# Patient Record
Sex: MALE | Race: WHITE | NOT HISPANIC OR LATINO | Employment: OTHER | URBAN - METROPOLITAN AREA
[De-identification: names, ages, dates, MRNs, and addresses within clinical notes are randomized per-mention and may not be internally consistent; named-entity substitution may affect disease eponyms.]

---

## 2020-09-01 ENCOUNTER — OFFICE VISIT (OUTPATIENT)
Dept: URGENT CARE | Facility: CLINIC | Age: 40
End: 2020-09-01
Payer: COMMERCIAL

## 2020-09-01 VITALS
HEART RATE: 68 BPM | HEIGHT: 70 IN | WEIGHT: 145 LBS | BODY MASS INDEX: 20.76 KG/M2 | OXYGEN SATURATION: 100 % | DIASTOLIC BLOOD PRESSURE: 65 MMHG | SYSTOLIC BLOOD PRESSURE: 119 MMHG | TEMPERATURE: 96 F | RESPIRATION RATE: 16 BRPM

## 2020-09-01 DIAGNOSIS — R51.9 ACUTE NONINTRACTABLE HEADACHE, UNSPECIFIED HEADACHE TYPE: ICD-10-CM

## 2020-09-01 DIAGNOSIS — K04.7 DENTAL INFECTION: Primary | ICD-10-CM

## 2020-09-01 PROCEDURE — 99203 OFFICE O/P NEW LOW 30 MIN: CPT | Performed by: PHYSICIAN ASSISTANT

## 2020-09-01 RX ORDER — IBUPROFEN 200 MG
TABLET ORAL EVERY 6 HOURS PRN
COMMUNITY

## 2020-09-01 RX ORDER — DIPHENHYDRAMINE HCL 25 MG
25 TABLET ORAL EVERY 6 HOURS PRN
COMMUNITY

## 2020-09-01 RX ORDER — PREDNISONE 10 MG/1
10 TABLET ORAL DAILY
Qty: 21 TABLET | Refills: 0 | Status: SHIPPED | OUTPATIENT
Start: 2020-09-01 | End: 2022-07-27 | Stop reason: ALTCHOICE

## 2020-09-01 RX ORDER — CETIRIZINE HYDROCHLORIDE 10 MG/1
10 TABLET ORAL DAILY
COMMUNITY
End: 2022-07-27 | Stop reason: ALTCHOICE

## 2020-09-01 RX ORDER — AMOXICILLIN 500 MG/1
500 CAPSULE ORAL EVERY 12 HOURS SCHEDULED
Qty: 14 CAPSULE | Refills: 0 | Status: SHIPPED | OUTPATIENT
Start: 2020-09-01 | End: 2020-09-08

## 2020-09-01 NOTE — PROGRESS NOTES
[facility]        NAME: Priyanka Loomis is a 35 y o  male  : 1980    MRN: [id number]  DATE: 2020  TIME: 6:45 PM    Assessment and Plan   Dental infection [K04 7]  1  Dental infection  amoxicillin (AMOXIL) 500 mg capsule    predniSONE 10 mg tablet    Ambulatory referral to Dentistry   2  Acute nonintractable headache, unspecified headache type       Dental infection verses old billing verse facial nerve irritation  Will treat as infection due to surrounding gingivitis and patient history of recurrent food stuck  Patient Instructions   Take antibiotic as prescribed  Complete full dose even if symptoms began to improve  Take prednisone as prescribed  Make appoint with dentistry  Observe for signs of worsening infection including increased pain, discharge, fevers and chills, or persistent symptoms  Follow up with PCP in 3-5 days  Proceed to  ER if symptoms worsen  Chief Complaint     Chief Complaint   Patient presents with    Neck Pain     c/o tenderness right side neck under jaw area that radiates up side of neck and head approx 4 days, worsening since onset  History of Present Illness       Patient is a 44-year-old male with significant past medical history of smoking and seasonal allergies presents the office complaining of anterior right-sided neck pain just proximal to the mandible with radiation behind the ear and up to the right temple for 4 days  Pain is a mild discomfort at rest and rated a 6-7/10 sharp pain with yawning which only last a few seconds  Patient also reports a constant 6-7/10 "annoying" right temporal headache  He has some mild rhinorrhea and intermittent odynophagia which he attributes to allergies but denies any fevers, chills, dizziness, lightheadedness, changes in vision, ear pain, cough, dental pain, dysphagia, sore throat, or trouble breathing    Patient does not believe it to be dental related but admits he often gets food stuck between his teeth in that area  He has been treating his symptoms with ibuprofen with good results  Review of Systems   Review of Systems   Constitutional: Negative for chills and fever  HENT: Positive for rhinorrhea  Negative for congestion, ear pain, sore throat and trouble swallowing  Respiratory: Negative for cough and shortness of breath  Cardiovascular: Negative for chest pain and palpitations  Gastrointestinal: Negative for abdominal pain, diarrhea, nausea and vomiting  Musculoskeletal: Positive for neck pain  Negative for myalgias  Skin: Negative for rash  Neurological: Positive for headaches  Negative for dizziness and light-headedness  Current Medications       Current Outpatient Medications:     cetirizine (ZyrTEC) 10 mg tablet, Take 10 mg by mouth daily, Disp: , Rfl:     diphenhydrAMINE (BENADRYL) 25 mg tablet, Take 25 mg by mouth every 6 (six) hours as needed for itching, Disp: , Rfl:     ibuprofen (MOTRIN) 200 mg tablet, Take by mouth every 6 (six) hours as needed for mild pain, Disp: , Rfl:     amoxicillin (AMOXIL) 500 mg capsule, Take 1 capsule (500 mg total) by mouth every 12 (twelve) hours for 7 days, Disp: 14 capsule, Rfl: 0    predniSONE 10 mg tablet, Take 1 tablet (10 mg total) by mouth daily Take 6 on day 1, take 5 on day 2, take 4 on day 3, take 3 on day 4, take 2 on day 5, take 1 on day 6 , Disp: 21 tablet, Rfl: 0    Current Allergies     Allergies as of 09/01/2020 - Reviewed 09/01/2020   Allergen Reaction Noted    Morphine Syncope 09/01/2020            The following portions of the patient's history were reviewed and updated as appropriate: allergies, current medications, past family history, past medical history, past social history, past surgical history and problem list      Past Medical History:   Diagnosis Date    Allergic        Past Surgical History:   Procedure Laterality Date    HERNIA REPAIR      IR CHEST TUBE PLACEMENT         History reviewed  No pertinent family history  Medications have been verified  Objective   /65   Pulse 68   Temp (!) 96 °F (35 6 °C) (Tympanic)   Resp 16   Ht 5' 10" (1 778 m)   Wt 65 8 kg (145 lb)   SpO2 100%   BMI 20 81 kg/m²        Physical Exam     Physical Exam  Vitals signs and nursing note reviewed  Constitutional:       Appearance: Normal appearance  He is well-developed  HENT:      Head: Normocephalic and atraumatic  Right Ear: Tympanic membrane, ear canal and external ear normal       Left Ear: Tympanic membrane, ear canal and external ear normal       Nose: Nose normal       Mouth/Throat:      Lips: Pink  Mouth: Mucous membranes are moist       Dentition: Abnormal dentition  Gingival swelling and dental caries (Right lower posterior cavity verses old filling  Surrounding gingivitis ) present  No dental tenderness or dental abscesses  Pharynx: Oropharynx is clear  Uvula midline  Eyes:      General: Lids are normal       Conjunctiva/sclera: Conjunctivae normal       Pupils: Pupils are equal, round, and reactive to light  Neck:      Musculoskeletal: Neck supple  Cardiovascular:      Rate and Rhythm: Normal rate and regular rhythm  Heart sounds: Normal heart sounds  No murmur  No friction rub  No gallop  Pulmonary:      Effort: Pulmonary effort is normal       Breath sounds: Normal breath sounds  No stridor  No wheezing or rales  Musculoskeletal: Normal range of motion  Lymphadenopathy:      Cervical: No cervical adenopathy  Skin:     General: Skin is warm and dry  Capillary Refill: Capillary refill takes less than 2 seconds  Findings: No rash  Neurological:      Mental Status: He is alert

## 2020-09-01 NOTE — PATIENT INSTRUCTIONS
Take antibiotic as prescribed  Complete full dose even if symptoms began to improve  Take prednisone as prescribed  Make appoint with dentistry  Observe for signs of worsening infection including increased pain, discharge, fevers and chills, or persistent symptoms  Follow-up with PCP in 3-5 days  Go to ER if symptoms become severe  Dental Caries   WHAT YOU NEED TO KNOW:   Dental caries are also called cavities  Cavities are caused by bacteria  When the bacteria in tooth plaque (sticky film) mix with certain types of carbohydrate, this creates acid  The acid breaks down areas of enamel, which covers the outside of a tooth  This creates a small hole in the tooth called a cavity  DISCHARGE INSTRUCTIONS:   Return to the emergency department if:   · Your face, jaw, cheek, eye, or neck begin to swell  Contact your dentist if:   · You have a fever  · Your tooth pain gets worse  · You have questions or concerns about your condition or care  Follow up with your dentist as directed:  Write down your questions so you remember to ask them during your visits  Prevent dental caries:   · Brush your teeth at least 2 times a day with fluoride toothpaste  · Use dental floss to clean between your teeth at least once a day  · Rinse your mouth with water or mouthwash after meals and snacks  · Chew sugarless gum after meals and snacks  · See your dentist regularly for dental cleanings and oral exams  © 2017 [address] is for End User's use only and may not be sold, redistributed or otherwise used for commercial purposes  All illustrations and images included in CareNotes® are the copyrighted property of A D A M , Inc  or Jose Ortiz  The above information is an  only  It is not intended as medical advice for individual conditions or treatments   Talk to your doctor, nurse or pharmacist before following any medical regimen to see if it is safe and effective for you

## 2022-07-27 ENCOUNTER — APPOINTMENT (EMERGENCY)
Dept: RADIOLOGY | Facility: HOSPITAL | Age: 42
End: 2022-07-27
Payer: COMMERCIAL

## 2022-07-27 ENCOUNTER — HOSPITAL ENCOUNTER (EMERGENCY)
Facility: HOSPITAL | Age: 42
Discharge: HOME | End: 2022-07-27
Attending: EMERGENCY MEDICINE
Payer: COMMERCIAL

## 2022-07-27 VITALS
OXYGEN SATURATION: 99 % | RESPIRATION RATE: 15 BRPM | DIASTOLIC BLOOD PRESSURE: 77 MMHG | TEMPERATURE: 98.1 F | HEART RATE: 56 BPM | WEIGHT: 145 LBS | HEIGHT: 71 IN | SYSTOLIC BLOOD PRESSURE: 113 MMHG | BODY MASS INDEX: 20.3 KG/M2

## 2022-07-27 DIAGNOSIS — U07.1 COVID-19: ICD-10-CM

## 2022-07-27 DIAGNOSIS — R06.00 DYSPNEA: Primary | ICD-10-CM

## 2022-07-27 LAB
ALBUMIN SERPL BCP-MCNC: 4.3 G/DL (ref 3.5–5)
ALP SERPL-CCNC: 45 U/L (ref 46–116)
ALT SERPL W P-5'-P-CCNC: 30 U/L (ref 12–78)
ANION GAP SERPL CALCULATED.3IONS-SCNC: 4 MMOL/L (ref 4–13)
AST SERPL W P-5'-P-CCNC: 27 U/L (ref 5–45)
ATRIAL RATE: 69 BPM
BASOPHILS # BLD AUTO: 0.01 THOUSANDS/ΜL (ref 0–0.1)
BASOPHILS NFR BLD AUTO: 0 % (ref 0–1)
BILIRUB SERPL-MCNC: 0.55 MG/DL (ref 0.2–1)
BUN SERPL-MCNC: 16 MG/DL (ref 5–25)
CALCIUM SERPL-MCNC: 8.8 MG/DL (ref 8.3–10.1)
CARDIAC TROPONIN I PNL SERPL HS: 4 NG/L
CHLORIDE SERPL-SCNC: 103 MMOL/L (ref 96–108)
CO2 SERPL-SCNC: 31 MMOL/L (ref 21–32)
CREAT SERPL-MCNC: 1.26 MG/DL (ref 0.6–1.3)
D DIMER PPP FEU-MCNC: <0.27 UG/ML FEU
EOSINOPHIL # BLD AUTO: 0.01 THOUSAND/ΜL (ref 0–0.61)
EOSINOPHIL NFR BLD AUTO: 0 % (ref 0–6)
ERYTHROCYTE [DISTWIDTH] IN BLOOD BY AUTOMATED COUNT: 11.6 % (ref 11.6–15.1)
GFR SERPL CREATININE-BSD FRML MDRD: 69 ML/MIN/1.73SQ M
GLUCOSE SERPL-MCNC: 100 MG/DL (ref 65–140)
HCT VFR BLD AUTO: 45.3 % (ref 36.5–49.3)
HGB BLD-MCNC: 15.3 G/DL (ref 12–17)
IMM GRANULOCYTES # BLD AUTO: 0.01 THOUSAND/UL (ref 0–0.2)
IMM GRANULOCYTES NFR BLD AUTO: 0 % (ref 0–2)
LYMPHOCYTES # BLD AUTO: 1.36 THOUSANDS/ΜL (ref 0.6–4.47)
LYMPHOCYTES NFR BLD AUTO: 37 % (ref 14–44)
MCH RBC QN AUTO: 29.4 PG (ref 26.8–34.3)
MCHC RBC AUTO-ENTMCNC: 33.8 G/DL (ref 31.4–37.4)
MCV RBC AUTO: 87 FL (ref 82–98)
MONOCYTES # BLD AUTO: 0.3 THOUSAND/ΜL (ref 0.17–1.22)
MONOCYTES NFR BLD AUTO: 8 % (ref 4–12)
NEUTROPHILS # BLD AUTO: 1.97 THOUSANDS/ΜL (ref 1.85–7.62)
NEUTS SEG NFR BLD AUTO: 55 % (ref 43–75)
NRBC BLD AUTO-RTO: 0 /100 WBCS
NT-PROBNP SERPL-MCNC: 125 PG/ML
P AXIS: 77 DEGREES
PLATELET # BLD AUTO: 197 THOUSANDS/UL (ref 149–390)
PMV BLD AUTO: 10.6 FL (ref 8.9–12.7)
POTASSIUM SERPL-SCNC: 4.8 MMOL/L (ref 3.5–5.3)
PR INTERVAL: 168 MS
PROT SERPL-MCNC: 7.6 G/DL (ref 6.4–8.4)
QRS AXIS: 85 DEGREES
QRSD INTERVAL: 88 MS
QT INTERVAL: 396 MS
QTC INTERVAL: 424 MS
RBC # BLD AUTO: 5.21 MILLION/UL (ref 3.88–5.62)
SARS-COV-2 AG UPPER RESP QL IA: POSITIVE
SODIUM SERPL-SCNC: 138 MMOL/L (ref 135–147)
T WAVE AXIS: 58 DEGREES
VENTRICULAR RATE: 69 BPM
WBC # BLD AUTO: 3.66 THOUSAND/UL (ref 4.31–10.16)

## 2022-07-27 PROCEDURE — 80053 COMPREHEN METABOLIC PANEL: CPT | Performed by: EMERGENCY MEDICINE

## 2022-07-27 PROCEDURE — 71045 X-RAY EXAM CHEST 1 VIEW: CPT

## 2022-07-27 PROCEDURE — 84484 ASSAY OF TROPONIN QUANT: CPT | Performed by: EMERGENCY MEDICINE

## 2022-07-27 PROCEDURE — 93005 ELECTROCARDIOGRAM TRACING: CPT

## 2022-07-27 PROCEDURE — 87811 SARS-COV-2 COVID19 W/OPTIC: CPT | Performed by: EMERGENCY MEDICINE

## 2022-07-27 PROCEDURE — 36415 COLL VENOUS BLD VENIPUNCTURE: CPT | Performed by: EMERGENCY MEDICINE

## 2022-07-27 PROCEDURE — 99284 EMERGENCY DEPT VISIT MOD MDM: CPT

## 2022-07-27 PROCEDURE — 99285 EMERGENCY DEPT VISIT HI MDM: CPT | Performed by: EMERGENCY MEDICINE

## 2022-07-27 PROCEDURE — 85025 COMPLETE CBC W/AUTO DIFF WBC: CPT | Performed by: EMERGENCY MEDICINE

## 2022-07-27 PROCEDURE — 85379 FIBRIN DEGRADATION QUANT: CPT | Performed by: EMERGENCY MEDICINE

## 2022-07-27 PROCEDURE — 83880 ASSAY OF NATRIURETIC PEPTIDE: CPT | Performed by: EMERGENCY MEDICINE

## 2022-07-27 RX ORDER — LORAZEPAM 1 MG/1
1 TABLET ORAL ONCE
Status: COMPLETED | OUTPATIENT
Start: 2022-07-27 | End: 2022-07-27

## 2022-07-27 RX ADMIN — LORAZEPAM 1 MG: 1 TABLET ORAL at 10:44

## 2022-07-27 NOTE — Clinical Note
Omidfarideh Guerriergraham was seen and treated in our emergency department on 7/27/2022  Diagnosis:     Vicenta Blum  may return to work on return date  He may return on this date: 07/30/2022    If fever free for 48 hours     If you have any questions or concerns, please don't hesitate to call        Pierre Reinoso MD    ______________________________           _______________          _______________  Hospital Representative                              Date                                Time

## 2022-07-27 NOTE — ED PROVIDER NOTES
History  Chief Complaint   Patient presents with    Generalized Body Aches     Pt  States he is short of breath, body aches, rapid heartbeat at work yesterday with anxiety       History provided by:  Medical records, patient and significant other  Shortness of Breath  Severity:  Mild  Onset quality:  Gradual  Duration:  1 week  Timing:  Constant  Progression:  Unchanged  Chronicity:  New  Context comment:  Patient with URI last week, tested positive for COVID 1 week ago, has been having feeling of constant panic attack since  Poor appetite but eating small amounts and drinking well  Relieved by:  Nothing  Worsened by:  Nothing  Ineffective treatments:  None tried  Associated symptoms: no abdominal pain, no chest pain, no cough, no ear pain, no fever, no headaches, no rash, no sore throat and no vomiting        Prior to Admission Medications   Prescriptions Last Dose Informant Patient Reported? Taking? diphenhydrAMINE (BENADRYL) 25 mg tablet 7/26/2022 at Unknown time  Yes Yes   Sig: Take 25 mg by mouth every 6 (six) hours as needed for itching   ibuprofen (MOTRIN) 200 mg tablet 7/26/2022 at Unknown time  Yes Yes   Sig: Take by mouth every 6 (six) hours as needed for mild pain      Facility-Administered Medications: None       Past Medical History:   Diagnosis Date    Allergic     Pneumothorax        Past Surgical History:   Procedure Laterality Date    HERNIA REPAIR      IR CHEST TUBE PLACEMENT         History reviewed  No pertinent family history  I have reviewed and agree with the history as documented      E-Cigarette/Vaping    E-Cigarette Use Current Every Day User     Cartridges/Day 1-2      E-Cigarette/Vaping Substances    Nicotine Yes     THC No     CBD No     Flavoring Yes     Other No     Unknown No      Social History     Tobacco Use    Smoking status: Current Every Day Smoker     Types: E-Cigarettes    Smokeless tobacco: Never Used   Vaping Use    Vaping Use: Every day    Substances: Nicotine, Flavoring   Substance Use Topics    Alcohol use: Yes     Alcohol/week: 12 0 standard drinks     Types: 12 Cans of beer per week    Drug use: Yes     Types: Marijuana       Review of Systems   Constitutional: Positive for appetite change and fatigue  Negative for chills and fever  HENT: Negative for ear pain, rhinorrhea, sore throat and trouble swallowing  Eyes: Negative for pain, discharge and visual disturbance  Respiratory: Positive for shortness of breath  Negative for cough and chest tightness  Cardiovascular: Negative for chest pain and palpitations  Gastrointestinal: Negative for abdominal pain, nausea and vomiting  Endocrine: Negative for polydipsia, polyphagia and polyuria  Genitourinary: Negative for difficulty urinating, dysuria, hematuria and testicular pain  Musculoskeletal: Negative for arthralgias and back pain  Skin: Negative for color change and rash  Allergic/Immunologic: Negative for immunocompromised state  Neurological: Negative for dizziness, seizures, syncope, weakness and headaches  Hematological: Negative for adenopathy  Psychiatric/Behavioral: Negative for confusion and dysphoric mood  All other systems reviewed and are negative  Physical Exam  Physical Exam  Constitutional:       General: He is not in acute distress  Appearance: Normal appearance  He is not ill-appearing, toxic-appearing or diaphoretic  HENT:      Head: Normocephalic and atraumatic  Nose: Nose normal  No congestion or rhinorrhea  Mouth/Throat:      Mouth: Mucous membranes are moist       Pharynx: Oropharynx is clear  No oropharyngeal exudate or posterior oropharyngeal erythema  Eyes:      General:         Right eye: No discharge  Left eye: No discharge  Cardiovascular:      Rate and Rhythm: Normal rate and regular rhythm  Pulses: Normal pulses  Heart sounds: Normal heart sounds  No murmur heard  No gallop     Pulmonary:      Effort: Pulmonary effort is normal  No respiratory distress  Breath sounds: Normal breath sounds  No stridor  No wheezing, rhonchi or rales  Chest:      Chest wall: No tenderness  Abdominal:      General: Bowel sounds are normal  There is no distension  Palpations: Abdomen is soft  There is no mass  Tenderness: There is no abdominal tenderness  There is no right CVA tenderness, left CVA tenderness, guarding or rebound  Hernia: No hernia is present  Musculoskeletal:         General: Normal range of motion  Cervical back: Normal range of motion and neck supple  Skin:     General: Skin is warm and dry  Capillary Refill: Capillary refill takes less than 2 seconds  Neurological:      General: No focal deficit present  Mental Status: He is alert and oriented to person, place, and time  Cranial Nerves: No cranial nerve deficit  Sensory: No sensory deficit  Motor: No weakness  Coordination: Coordination normal       Gait: Gait normal       Deep Tendon Reflexes: Reflexes normal    Psychiatric:         Mood and Affect: Mood normal          Behavior: Behavior normal          Thought Content:  Thought content normal          Judgment: Judgment normal          Vital Signs  ED Triage Vitals [07/27/22 0836]   Temperature Pulse Respirations Blood Pressure SpO2   98 1 °F (36 7 °C) 67 18 115/73 99 %      Temp Source Heart Rate Source Patient Position - Orthostatic VS BP Location FiO2 (%)   Temporal Monitor Lying Right arm --      Pain Score       No Pain           Vitals:    07/27/22 0915 07/27/22 0930 07/27/22 0945 07/27/22 1000   BP: 119/73 109/68 110/74 113/77   Pulse: 60 60 62 56   Patient Position - Orthostatic VS:             Visual Acuity      ED Medications  Medications   LORazepam (ATIVAN) tablet 1 mg (1 mg Oral Given 7/27/22 1044)       Diagnostic Studies  Results Reviewed     Procedure Component Value Units Date/Time    HS Troponin I 4hr [[id number]]     Lab Status: No result Specimen: Blood     HS Troponin I 2hr [[id number]]     Lab Status: No result Specimen: Blood     HS Troponin 0hr (reflex protocol) [[id number]]  (Normal) Collected: 07/27/22 0853    Lab Status: Final result Specimen: Blood from Arm, Left Updated: 07/27/22 0923     hs TnI 0hr 4 ng/L     Comprehensive metabolic panel [[id number]]  (Abnormal) Collected: 07/27/22 0853    Lab Status: Final result Specimen: Blood from Arm, Left Updated: 07/27/22 5362     Sodium 138 mmol/L      Potassium 4 8 mmol/L      Chloride 103 mmol/L      CO2 31 mmol/L      ANION GAP 4 mmol/L      BUN 16 mg/dL      Creatinine 1 26 mg/dL      Glucose 100 mg/dL      Calcium 8 8 mg/dL      AST 27 U/L      ALT 30 U/L      Alkaline Phosphatase 45 U/L      Total Protein 7 6 g/dL      Albumin 4 3 g/dL      Total Bilirubin 0 55 mg/dL      eGFR 69 ml/min/1 73sq m     Narrative:      [place] guidelines for Chronic Kidney Disease (CKD):     Stage 1 with normal or high GFR (GFR > 90 mL/min/1 73 square meters)    Stage 2 Mild CKD (GFR = 60-89 mL/min/1 73 square meters)    Stage 3A Moderate CKD (GFR = 45-59 mL/min/1 73 square meters)    Stage 3B Moderate CKD (GFR = 30-44 mL/min/1 73 square meters)    Stage 4 Severe CKD (GFR = 15-29 mL/min/1 73 square meters)    Stage 5 End Stage CKD (GFR <15 mL/min/1 73 square meters)  Note: GFR calculation is accurate only with a steady state creatinine    NT-BNP PRO [[id number]]  (Abnormal) Collected: 07/27/22 0853    Lab Status: Final result Specimen: Blood from Arm, Left Updated: 07/27/22 0922     NT-proBNP 125 pg/mL     D-Dimer [[id number]]  (Normal) Collected: 07/27/22 0853    Lab Status: Final result Specimen: Blood from Arm, Left Updated: 07/27/22 0918     D-Dimer, Quant <0 27 ug/ml FEU     COVID Rapid Antigen [[id number]]  (Abnormal) Collected: 07/27/22 0853    Lab Status: Final result Specimen: Nares from Nose Updated: 07/27/22 0918     SARS-CoV-2 Ag Positive    CBC and differential [[id number]]  (Abnormal) Collected: 07/27/22 0853    Lab Status: Final result Specimen: Blood from Arm, Left Updated: 07/27/22 0859     WBC 3 66 Thousand/uL      RBC 5 21 Million/uL      Hemoglobin 15 3 g/dL      Hematocrit 45 3 %      MCV 87 fL      MCH 29 4 pg      MCHC 33 8 g/dL      RDW 11 6 %      MPV 10 6 fL      Platelets 884 Thousands/uL      nRBC 0 /100 WBCs      Neutrophils Relative 55 %      Immat GRANS % 0 %      Lymphocytes Relative 37 %      Monocytes Relative 8 %      Eosinophils Relative 0 %      Basophils Relative 0 %      Neutrophils Absolute 1 97 Thousands/µL      Immature Grans Absolute 0 01 Thousand/uL      Lymphocytes Absolute 1 36 Thousands/µL      Monocytes Absolute 0 30 Thousand/µL      Eosinophils Absolute 0 01 Thousand/µL      Basophils Absolute 0 01 Thousands/µL                  XR chest 1 view portable   ED Interpretation by Tonia Henry MD (07/27 0915)   No acute pathology      Final Result by Horacio Drew MD (07/27 3642)      No acute cardiopulmonary disease  Workstation performed: [id number]                    Procedures  Procedures         ED Course  ED Course as of 07/27/22 1200   Wed Jul 27, 2022   0936 0826:  Patient appears well, vital signs reviewed  Placed on a monitor  COVID positive 1 week ago  Patient with ongoing dyspnea, states he has a history of anxiety attacks and not sure if this is the etiology  No chest pain  Plan to complete basic labs including cardiac enzymes, D-dimer, BNP, check EKG and chest x-ray  1000 1000:  Chest x-ray and labs reviewed  Patient has remained stable throughout ED course  Stable for discharge               HEART Risk Score    Flowsheet Row Most Recent Value   Heart Score Risk Calculator    History 0 Filed at: 07/27/2022 1010   ECG 0 Filed at: 07/27/2022 1010   Age 0 Filed at: 07/27/2022 1010   Risk Factors 0 Filed at: 07/27/2022 1010   Troponin 0 Filed at: 07/27/2022 1010   HEART Score 0 Filed at: 07/27/2022 1010                        SBIRT 22yo+    Flowsheet Row Most Recent Value   SBIRT (23 yo +)    In order to provide better care to our patients, we are screening all of our patients for alcohol and drug use  Would it be okay to ask you these screening questions? Yes Filed at: 07/27/2022 1045   Initial Alcohol Screen: US AUDIT-C     1  How often do you have a drink containing alcohol? 4 Filed at: 07/27/2022 1045   2  How many drinks containing alcohol do you have on a typical day you are drinking? 0 Filed at: 07/27/2022 1045   3a  Male UNDER 65: How often do you have five or more drinks on one occasion? 0 Filed at: 07/27/2022 1045   Audit-C Score 4 Filed at: 07/27/2022 1045   MARYELLEN: How many times in the past year have you    Used an illegal drug or used a prescription medication for non-medical reasons? Never Filed at: 07/27/2022 1045                    MDM    Disposition  Final diagnoses:   Dyspnea   COVID-19     Time reflects when diagnosis was documented in both MDM as applicable and the Disposition within this note     Time User Action Codes Description Comment    7/27/2022 10:10 AM Wandra Cinnamon Add [R06 00] Dyspnea     7/27/2022 10:10 AM Wandra Cinnamon Add [U07 1] COVID-19       ED Disposition     ED Disposition   Discharge    Condition   Stable    Date/Time   Wed Jul 27, 2022 10:10 AM    Comment   Myla Dry discharge to home/self care  Follow-up Information     Follow up With Specialties Details Why Contact Jerry Clark  Schedule an appointment as soon as possible for a visit  As needed [address] [place] [id number]  [phone]          Discharge Medication List as of 7/27/2022 10:11 AM      CONTINUE these medications which have NOT CHANGED    Details   diphenhydrAMINE (BENADRYL) 25 mg tablet Take 25 mg by mouth every 6 (six) hours as needed for itching, Historical Med      ibuprofen (MOTRIN) 200 mg tablet Take by mouth every 6 (six) hours as needed for mild pain, Historical Med             No discharge procedures on file      PDMP Review     None          ED Provider  Electronically Signed by           Emily Wright MD  07/27/22 1200

## 2022-10-23 ENCOUNTER — OFFICE VISIT (OUTPATIENT)
Dept: URGENT CARE | Facility: CLINIC | Age: 42
End: 2022-10-23
Payer: COMMERCIAL

## 2022-10-23 VITALS
BODY MASS INDEX: 20.3 KG/M2 | TEMPERATURE: 97 F | HEART RATE: 66 BPM | WEIGHT: 145 LBS | OXYGEN SATURATION: 99 % | DIASTOLIC BLOOD PRESSURE: 76 MMHG | RESPIRATION RATE: 16 BRPM | SYSTOLIC BLOOD PRESSURE: 121 MMHG | HEIGHT: 71 IN

## 2022-10-23 DIAGNOSIS — S70.362A TICK BITE OF LEFT THIGH, INITIAL ENCOUNTER: Primary | ICD-10-CM
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
## 2022-10-23 PROCEDURE — 99213 OFFICE O/P EST LOW 20 MIN: CPT | Performed by: PHYSICIAN ASSISTANT

## 2022-10-23 RX ORDER — DOXYCYCLINE 100 MG/1
200 TABLET ORAL ONCE
Qty: 2 TABLET | Refills: 0 | Status: SHIPPED | COMMUNITY
Start: 2022-10-23 | End: 2022-10-23

## 2022-10-23 NOTE — PROGRESS NOTES
[facility]        NAME: Aníbal Sahni is a 36 y o  male  : 1980    MRN: [id number]  DATE: 2022  TIME: 10:10 AM    Assessment and Plan   Tick bite of left thigh, initial encounter Karis Nava  XXXA]  1  Tick bite of left thigh, initial encounter  doxycycline (ADOXA) 100 MG tablet         Patient Instructions   Take antibiotic as prescribed  Complete full dose of antibiotics even if symptoms begin to improve or resolve  May use OTC Tylenol for fever  Observe for signs of worsening infection including increased swelling, redness, pain, discharge, fever or chills, or persistent symptoms  Your symptoms should begin to improve over the next couple days  Take doxycycline as prescribed  Do not use dairy or reflux medications 2 hours before or 2 hours after taking  Avoid sun exposure  Follow up with PCP in 3-5 days  Proceed to  ER if symptoms worsen  Chief Complaint     Chief Complaint   Patient presents with   • Insect Bite     Found a tic that was attached to left thigh  Was able to remove it but was really buried and don't know how long it was attached  Now has redness around area         History of Present Illness       Patient is a 41-year-old male with no significant past medical history presents the office complaining of tick bite to his left anterior thigh  States he was able to remove it but not sure how long it was there  Reports small area of redness but denies fevers, chills, flu-like symptoms, or target rashes  He did clean the area with alcohol  Reports it was a deer tick  Review of Systems   Review of Systems   Constitutional: Negative for chills and fever  Musculoskeletal: Negative for arthralgias and myalgias  Skin: Positive for wound  Negative for rash           Current Medications       Current Outpatient Medications:   •  diphenhydrAMINE (BENADRYL) 25 mg tablet, Take 25 mg by mouth every 6 (six) hours as needed for itching, Disp: , Rfl:   • doxycycline (ADOXA) 100 MG tablet, Take 2 tablets (200 mg total) by mouth 1 (one) time for 1 dose, Disp: 2 tablet, Rfl: 0  •  ibuprofen (MOTRIN) 200 mg tablet, Take by mouth every 6 (six) hours as needed for mild pain, Disp: , Rfl:     Current Allergies     Allergies as of 10/23/2022 - Reviewed 10/23/2022   Allergen Reaction Noted   • Morphine Syncope 09/01/2020            The following portions of the patient's history were reviewed and updated as appropriate: allergies, current medications, past family history, past medical history, past social history, past surgical history and problem list      Past Medical History:   Diagnosis Date   • Allergic    • Pneumothorax        Past Surgical History:   Procedure Laterality Date   • HERNIA REPAIR     • IR CHEST TUBE PLACEMENT         Family History   Problem Relation Age of Onset   • Cancer Mother    • Cancer Father          Medications have been verified  Objective   /76   Pulse 66   Temp (!) 97 °F (36 1 °C)   Resp 16   Ht 5' 11" (1 803 m)   Wt 65 8 kg (145 lb)   SpO2 99%   BMI 20 22 kg/m²   No LMP for male patient  Physical Exam     Physical Exam  Vitals and nursing note reviewed  Constitutional:       Appearance: He is well-developed  HENT:      Head: Normocephalic and atraumatic  Right Ear: External ear normal       Left Ear: External ear normal       Nose: Nose normal    Eyes:      General: Lids are normal       Conjunctiva/sclera: Conjunctivae normal    Skin:     General: Skin is warm and dry  Capillary Refill: Capillary refill takes less than 2 seconds  Findings: Wound (0 5 cm area of round erythema to the anterior left thigh  Center probed for possible remaining tick body parts with none found  Cleaned with alcohol  See image ) present  Neurological:      Mental Status: He is alert               Left anterior thigh

## 2025-02-21 ENCOUNTER — OFFICE VISIT (OUTPATIENT)
Dept: URGENT CARE | Facility: CLINIC | Age: 45
End: 2025-02-21
Payer: COMMERCIAL

## 2025-02-21 VITALS
HEIGHT: 72 IN | OXYGEN SATURATION: 98 % | DIASTOLIC BLOOD PRESSURE: 72 MMHG | WEIGHT: 156.4 LBS | RESPIRATION RATE: 16 BRPM | TEMPERATURE: 97.2 F | SYSTOLIC BLOOD PRESSURE: 119 MMHG | HEART RATE: 72 BPM | BODY MASS INDEX: 21.18 KG/M2

## 2025-02-21 DIAGNOSIS — J32.0 CHRONIC MAXILLARY SINUSITIS: Primary | ICD-10-CM

## 2025-02-21 DIAGNOSIS — L82.1 SEBORRHEIC KERATOSIS: ICD-10-CM

## 2025-02-21 PROCEDURE — S9083 URGENT CARE CENTER GLOBAL: HCPCS | Performed by: PHYSICIAN ASSISTANT

## 2025-02-21 PROCEDURE — G0382 LEV 3 HOSP TYPE B ED VISIT: HCPCS | Performed by: PHYSICIAN ASSISTANT

## 2025-02-21 RX ORDER — PREDNISONE 10 MG/1
TABLET ORAL
Qty: 30 TABLET | Refills: 0 | Status: SHIPPED | OUTPATIENT
Start: 2025-02-21

## 2025-02-21 NOTE — PROGRESS NOTES
[facility]        NAME: Jimmy Eckert is a 44 y.o. male  : 1980    MRN: [id number]  DATE: 2025  TIME: 11:37 AM    Assessment and Plan   Chronic maxillary sinusitis [J32.0]  1. Chronic maxillary sinusitis  predniSONE 10 mg tablet      2. Seborrheic keratosis              Patient Instructions       Follow up with PCP for further evaluation and treatment.  Explained to the patient that there is no infection in the sinuses and that this seems to be inflammatory.  If tests have been performed at [facility], our office will contact you with results if changes need to be made to the care plan discussed with you at the visit.  [facility]    Chief Complaint     Chief Complaint   Patient presents with    Facial Pain     Left side facial pain starting New Years [place]ma went away and came back. Seen by dentist not dental infection, tx with abx for sinus infection, felt better for a couple of weeks.    Pain returned this week, last night was unbearable.          History of Present Illness       He also showed a mole on his right eyebrow area that has enlarged.  He states is enlarged fairly quickly and scrapes off easily.        Sinusitis  This is a recurrent problem. The current episode started 1 to 4 weeks ago. The problem is unchanged. There has been no fever. The pain is moderate. Associated symptoms include congestion, headaches and sinus pressure. Pertinent negatives include no chills, coughing, diaphoresis, ear pain, hoarse voice, neck pain, shortness of breath, sneezing, sore throat or swollen glands. Past treatments include nothing. The treatment provided no relief.       Review of Systems   Review of Systems   Constitutional:  Negative for chills and diaphoresis.   HENT:  Positive for congestion and sinus pressure. Negative for ear pain, hoarse voice, sneezing and sore throat.    Respiratory:  Negative for cough and shortness of breath.     Musculoskeletal:  Negative for neck pain.   Neurological:  Positive for headaches.   All other systems reviewed and are negative.        Current Medications       Current Outpatient Medications:     diphenhydrAMINE (BENADRYL) 25 mg tablet, Take 25 mg by mouth every 6 (six) hours as needed for itching, Disp: , Rfl:     ibuprofen (MOTRIN) 200 mg tablet, Take by mouth every 6 (six) hours as needed for mild pain, Disp: , Rfl:     Phenylephrine-APAP-guaiFENesin (TYLENOL SINUS SEVERE PO), Take by mouth, Disp: , Rfl:     predniSONE 10 mg tablet, 5 x 4 days, 4 x 1,  3 x 1, 2 x 1, 1 x 1, Disp: 30 tablet, Rfl: 0    Current Allergies     Allergies as of 02/21/2025 - Reviewed 02/21/2025   Allergen Reaction Noted    Morphine Syncope 09/01/2020    Morphine and codeine Other (See Comments) 10/10/2005            The following portions of the patient's history were reviewed and updated as appropriate: allergies, current medications, past family history, past medical history, past social history, past surgical history and problem list.     Past Medical History:   Diagnosis Date    Allergic     Pneumothorax        Past Surgical History:   Procedure Laterality Date    HERNIA REPAIR      IR CHEST TUBE PLACEMENT         Family History   Problem Relation Age of Onset    Cancer Mother     Cancer Father          Medications have been verified.        Objective   /72   Pulse 72   Temp (!) 97.2 °F (36.2 °C)   Resp 16   Ht 6' (1.829 m)   Wt 70.9 kg (156 lb 6.4 oz)   SpO2 98%   BMI 21.21 kg/m²   No LMP for male patient.       Physical Exam     Physical Exam  Vitals and nursing note reviewed.   Constitutional:       Appearance: Normal appearance. He is normal weight.   HENT:      Right Ear: Ear canal and external ear normal.      Left Ear: Ear canal and external ear normal.      Nose: Congestion and rhinorrhea present.      Mouth/Throat:      Mouth: Mucous membranes are moist.   Eyes:      Conjunctiva/sclera: Conjunctivae normal.    Cardiovascular:      Rate and Rhythm: Normal rate and regular rhythm.      Pulses: Normal pulses.      Heart sounds: Normal heart sounds.   Pulmonary:      Effort: Pulmonary effort is normal.      Breath sounds: Normal breath sounds.   Lymphadenopathy:      Cervical: No cervical adenopathy.   Neurological:      Mental Status: He is alert.   Psychiatric:         Mood and Affect: Mood normal.         Behavior: Behavior normal.     Right lateral eyebrow area 1.5 inch x 1 inch area that appears to be a seborrheic keratosis    TMs bulging.  Positive RT maxillary tenderness.  Good transillumination